# Patient Record
Sex: MALE | Race: WHITE
[De-identification: names, ages, dates, MRNs, and addresses within clinical notes are randomized per-mention and may not be internally consistent; named-entity substitution may affect disease eponyms.]

---

## 2020-03-03 ENCOUNTER — HOSPITAL ENCOUNTER (EMERGENCY)
Dept: HOSPITAL 62 - ER | Age: 60
Discharge: HOME | End: 2020-03-03
Payer: COMMERCIAL

## 2020-03-03 VITALS — DIASTOLIC BLOOD PRESSURE: 88 MMHG | SYSTOLIC BLOOD PRESSURE: 133 MMHG

## 2020-03-03 DIAGNOSIS — I10: ICD-10-CM

## 2020-03-03 DIAGNOSIS — I25.10: ICD-10-CM

## 2020-03-03 DIAGNOSIS — Z79.899: ICD-10-CM

## 2020-03-03 DIAGNOSIS — I48.0: Primary | ICD-10-CM

## 2020-03-03 DIAGNOSIS — Z79.01: ICD-10-CM

## 2020-03-03 DIAGNOSIS — E11.9: ICD-10-CM

## 2020-03-03 DIAGNOSIS — Z79.82: ICD-10-CM

## 2020-03-03 DIAGNOSIS — E78.00: ICD-10-CM

## 2020-03-03 DIAGNOSIS — Z95.5: ICD-10-CM

## 2020-03-03 LAB
ADD MANUAL DIFF: NO
ALBUMIN SERPL-MCNC: 4.2 G/DL (ref 3.5–5)
ALP SERPL-CCNC: 69 U/L (ref 38–126)
ANION GAP SERPL CALC-SCNC: 9 MMOL/L (ref 5–19)
AST SERPL-CCNC: 45 U/L (ref 17–59)
BASOPHILS # BLD AUTO: 0.1 10^3/UL (ref 0–0.2)
BASOPHILS NFR BLD AUTO: 0.8 % (ref 0–2)
BILIRUB DIRECT SERPL-MCNC: 0 MG/DL (ref 0–0.4)
BILIRUB SERPL-MCNC: 0.9 MG/DL (ref 0.2–1.3)
BUN SERPL-MCNC: 27 MG/DL (ref 7–20)
CALCIUM: 8.9 MG/DL (ref 8.4–10.2)
CHLORIDE SERPL-SCNC: 102 MMOL/L (ref 98–107)
CO2 SERPL-SCNC: 28 MMOL/L (ref 22–30)
EOSINOPHIL # BLD AUTO: 0.3 10^3/UL (ref 0–0.6)
EOSINOPHIL NFR BLD AUTO: 3.2 % (ref 0–6)
ERYTHROCYTE [DISTWIDTH] IN BLOOD BY AUTOMATED COUNT: 13.2 % (ref 11.5–14)
GLUCOSE SERPL-MCNC: 154 MG/DL (ref 75–110)
HCT VFR BLD CALC: 46.1 % (ref 37.9–51)
HGB BLD-MCNC: 16.1 G/DL (ref 13.5–17)
LYMPHOCYTES # BLD AUTO: 3.4 10^3/UL (ref 0.5–4.7)
LYMPHOCYTES NFR BLD AUTO: 33.8 % (ref 13–45)
MCH RBC QN AUTO: 29.9 PG (ref 27–33.4)
MCHC RBC AUTO-ENTMCNC: 34.9 G/DL (ref 32–36)
MCV RBC AUTO: 86 FL (ref 80–97)
MONOCYTES # BLD AUTO: 0.8 10^3/UL (ref 0.1–1.4)
MONOCYTES NFR BLD AUTO: 7.9 % (ref 3–13)
NEUTROPHILS # BLD AUTO: 5.4 10^3/UL (ref 1.7–8.2)
NEUTS SEG NFR BLD AUTO: 54.3 % (ref 42–78)
PLATELET # BLD: 167 10^3/UL (ref 150–450)
POTASSIUM SERPL-SCNC: 3.9 MMOL/L (ref 3.6–5)
PROT SERPL-MCNC: 7.1 G/DL (ref 6.3–8.2)
RBC # BLD AUTO: 5.39 10^6/UL (ref 4.35–5.55)
TOTAL CELLS COUNTED % (AUTO): 100 %
WBC # BLD AUTO: 10 10^3/UL (ref 4–10.5)

## 2020-03-03 PROCEDURE — 84484 ASSAY OF TROPONIN QUANT: CPT

## 2020-03-03 PROCEDURE — 99285 EMERGENCY DEPT VISIT HI MDM: CPT

## 2020-03-03 PROCEDURE — 71045 X-RAY EXAM CHEST 1 VIEW: CPT

## 2020-03-03 PROCEDURE — 80053 COMPREHEN METABOLIC PANEL: CPT

## 2020-03-03 PROCEDURE — 93005 ELECTROCARDIOGRAM TRACING: CPT

## 2020-03-03 PROCEDURE — 36415 COLL VENOUS BLD VENIPUNCTURE: CPT

## 2020-03-03 PROCEDURE — 85025 COMPLETE CBC W/AUTO DIFF WBC: CPT

## 2020-03-03 PROCEDURE — 93010 ELECTROCARDIOGRAM REPORT: CPT

## 2020-03-03 NOTE — RADIOLOGY REPORT (SQ)
CLINICAL HISTORY:  chest pain 



COMPARISON: None.



TECHNIQUE: XR CHEST 1 VIEW 3/3/2020 3:57 AM CST



FINDINGS: 



Cardiac silhouette is normal in size. Lungs are clear without

consolidation, atelectasis, mass or edema. There is no pleural

effusion. There is no pneumothorax. There are no acute osseous

findings.



IMPRESSION: 



Clear lungs.

## 2020-03-03 NOTE — ER DOCUMENT REPORT
ED General





- General


Chief Complaint: Arrhythmia


Stated Complaint: ARRHYTHMIA


Time Seen by Provider: 03/03/20 05:09


TRAVEL OUTSIDE OF THE [place] IN LAST 30 DAYS: No





- HPI


Notes: 





Patient is a 59-year-old male with a history of intermittent atrial fibrillation

who presents to the emergency department for atrial fibrillation.  He states he 

woke up this morning at about 3 AM to go to the bathroom.  He walked over and 

found his heart rate to be 160.  He denies any real chest pain, states he feels 

just "an uncomfortable sensation" that he describes as palpitations in his 

chest.  He states he feels mildly short of breath with it.  He denies any 

nausea, diaphoresis, near syncope.  He has been on Cartia 120 mg as well as 

Eliquis for several months now.  He was referred on to electrophysiology prior 

to moving to the area, but did not make it to the appointment before he moved 

here.  He does not have a local primary care doctor or cardiologist.





- Related Data


Allergies/Adverse Reactions: 


                                        





No Known Allergies Allergy (Unverified 03/03/20 04:03)


   








Home Medications: 81mg ASA.  Milk Thistle.  Fish Oil.  Melatonin.  Eliquis.  

Crestor.  Cardizem 120mg PO.  Vitamins





Past Medical History





- General


Information source: Patient





- Social History


Smoking Status: Never Smoker


Family History: Reviewed & Not Pertinent


Patient has suicidal ideation: No


Patient has homicidal ideation: No





- Past Medical History


Cardiac Medical History: Reports: Hx Atrial Fibrillation, Hx Coronary Artery 

Disease, Hx Heart Attack, Hx Hypercholesterolemia, Hx Hypertension


Pulmonary Medical History: 


   Denies: Hx COPD


Endocrine Medical History: Reports: Hx Diabetes Mellitus Type 2 - Off of med

ication, was told he no longer needed it.  Denies: Hx Diabetes Mellitus Type 1


Past Surgical History: Reports: Hx Cardiac Catheterization - 1 stent





Review of Systems





- Review of Systems


Cardiovascular: See HPI


-: Yes All other systems reviewed and negative





Physical Exam





- Vital signs


Vitals: 


                                        











Resp


 


 16 


 


 03/03/20 03:39














- Notes


Notes: 





This is a 59-year-old male who appears his stated age in no acute distress.  

Vital signs reviewed, please refer to chart. Head is normocephalic, atraumatic. 

Pupils equal round, reactive to light.  Neck is supple without meningismus.  

Heart is irregularly irregular with normal S1, S2.  Lungs are clear to 

auscultation bilaterally.  Abdomen is soft, nontender, normoactive bowel sounds 

throughout.  Extremities without cyanosis, clubbing. Posterior calves are 

nontender.  Peripheral pulses are equal.  Skin is warm and dry.  Patient is 

awake, alert, neurological exam is nonfocal.





Course





- Re-evaluation


Re-evalutation: 





03/03/20 05:27


Patient presents emergency department for evaluation.  He is a 59-year-old male 

with a history of intermittent atrial fibrillation, already anticoagulated who 

presents to the ED.  His heart rate is between 90 and 110.  His laboratory 

investigations are unremarkable.  He is already appropriately anticoagulated.  

His chest x-ray fails to show any pulmonary edema.  At this point, patient asks 

me if he can have something "to get him out of atrial fibrillation."  I 

explained to him that I did not think that was possible.  I did plan to consult 

cardiology in regards to follow-up, patient is stable at this time, we will 

continue to monitor.


03/03/20 06:12


I spoke with Dr. Valadez.  Patient's stable, his laboratory investigations are 

unremarkable.  He agreed that outpatient rate control was appropriate, agreed 

with the addition of Toprol-XL.  He is given his first dose of 25 mg of Toprol 

here.  I will send him home with a prescription for same.  He is to follow-up 

with Dr. Valadez, return to the ED with worsening or new concerning symptoms of 

any sort.





- Vital Signs


Vital signs: 


                                        











Temp Pulse Resp BP Pulse Ox


 


       15   133/88 H  97 


 


       03/03/20 04:01  03/03/20 04:01  03/03/20 04:01














- Laboratory


Result Diagrams: 


                                 03/03/20 03:53





                                 03/03/20 03:53


Laboratory results interpreted by me: 


                                        











  03/03/20





  03:53


 


BUN  27 H


 


Glucose  154 H


 


ALT  64 H














- Diagnostic Test


Radiology reviewed: Reports reviewed


Radiology results interpreted by me: 





03/03/20 05:27





                                        





Chest X-Ray  03/03/20 03:57


IMPRESSION: 


 


Clear lungs.


 














- EKG Interpretation by Me


Additional EKG results interpreted by me: 





03/03/20 05:28


Atrial fibrillation with a rate of .  Normal axis and intervals.  

Nonspecific ST changes, but no acute changes concerning for ischemia or 

infarction.  No old studies available for comparison.





Discharge





- Discharge


Clinical Impression: 


 Paroxysmal atrial fibrillation





Condition: Stable


Disposition: HOME, SELF-CARE


Instructions:  Atrial Fibrillation ([facility])


Additional Instructions: 


Continue your regular medications as prescribed, add Toprol-XL once daily.  

Follow-up with Dr. Valadez, call his office today for an appointment.  Return to 

the emergency department with worsening or new concerning symptoms of any sort.


Referrals: 


LUCAS VALADEZ MD [ACTIVE STAFF] - Follow up as needed

## 2020-03-03 NOTE — EKG REPORT
SEVERITY:- ABNORMAL ECG -

ATRIAL FIBRILLATION, V-RATE 

BORDERLINE T ABNORMALITIES, LATERAL LEADS

EARLY PRECORDIAL TRANSITION, CONSIDER OLD TRUE POST MI

:

Confirmed by: Sami Ortiz MD 03-Mar-2020 06:50:23